# Patient Record
Sex: MALE | Race: WHITE | URBAN - METROPOLITAN AREA
[De-identification: names, ages, dates, MRNs, and addresses within clinical notes are randomized per-mention and may not be internally consistent; named-entity substitution may affect disease eponyms.]

---

## 2022-12-22 ENCOUNTER — EMERGENCY (EMERGENCY)
Facility: HOSPITAL | Age: 72
LOS: 0 days | Discharge: HOME | End: 2022-12-23
Attending: EMERGENCY MEDICINE
Payer: MEDICARE

## 2022-12-22 VITALS — HEIGHT: 68 IN | WEIGHT: 251.99 LBS

## 2022-12-22 DIAGNOSIS — R22.0 LOCALIZED SWELLING, MASS AND LUMP, HEAD: ICD-10-CM

## 2022-12-22 DIAGNOSIS — Z91.14 PATIENT'S OTHER NONCOMPLIANCE WITH MEDICATION REGIMEN: ICD-10-CM

## 2022-12-22 DIAGNOSIS — T39.1X6A UNDERDOSING OF 4-AMINOPHENOL DERIVATIVES, INITIAL ENCOUNTER: ICD-10-CM

## 2022-12-22 DIAGNOSIS — R23.8 OTHER SKIN CHANGES: ICD-10-CM

## 2022-12-22 DIAGNOSIS — T39.396A UNDERDOSING OF OTHER NONSTEROIDAL ANTI-INFLAMMATORY DRUGS [NSAID], INITIAL ENCOUNTER: ICD-10-CM

## 2022-12-22 DIAGNOSIS — L29.9 PRURITUS, UNSPECIFIED: ICD-10-CM

## 2022-12-22 DIAGNOSIS — T45.0X6A UNDERDOSING OF ANTIALLERGIC AND ANTIEMETIC DRUGS, INITIAL ENCOUNTER: ICD-10-CM
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
## 2022-12-22 DIAGNOSIS — I10 ESSENTIAL (PRIMARY) HYPERTENSION: ICD-10-CM

## 2022-12-22 DIAGNOSIS — L90.5 SCAR CONDITIONS AND FIBROSIS OF SKIN: ICD-10-CM

## 2022-12-22 DIAGNOSIS — R58 HEMORRHAGE, NOT ELSEWHERE CLASSIFIED: ICD-10-CM
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
## 2022-12-22 DIAGNOSIS — T78.49XA OTHER ALLERGY, INITIAL ENCOUNTER: ICD-10-CM

## 2022-12-22 DIAGNOSIS — Z96.651 PRESENCE OF RIGHT ARTIFICIAL KNEE JOINT: ICD-10-CM

## 2022-12-22 PROCEDURE — 96374 THER/PROPH/DIAG INJ IV PUSH: CPT

## 2022-12-22 PROCEDURE — 99284 EMERGENCY DEPT VISIT MOD MDM: CPT

## 2022-12-22 PROCEDURE — 99284 EMERGENCY DEPT VISIT MOD MDM: CPT | Mod: 25

## 2022-12-22 PROCEDURE — 96375 TX/PRO/DX INJ NEW DRUG ADDON: CPT

## 2022-12-22 RX ORDER — SODIUM CHLORIDE 9 MG/ML
1000 INJECTION INTRAMUSCULAR; INTRAVENOUS; SUBCUTANEOUS ONCE
Refills: 0 | Status: COMPLETED | OUTPATIENT
Start: 2022-12-22 | End: 2022-12-22

## 2022-12-22 RX ORDER — DIPHENHYDRAMINE HCL 50 MG
50 CAPSULE ORAL ONCE
Refills: 0 | Status: DISCONTINUED | OUTPATIENT
Start: 2022-12-22 | End: 2022-12-22

## 2022-12-22 RX ORDER — OXYCODONE HYDROCHLORIDE 5 MG/1
10 TABLET ORAL ONCE
Refills: 0 | Status: DISCONTINUED | OUTPATIENT
Start: 2022-12-22 | End: 2022-12-22

## 2022-12-22 RX ORDER — FAMOTIDINE 10 MG/ML
20 INJECTION INTRAVENOUS ONCE
Refills: 0 | Status: COMPLETED | OUTPATIENT
Start: 2022-12-22 | End: 2022-12-22

## 2022-12-22 RX ADMIN — FAMOTIDINE 20 MILLIGRAM(S): 10 INJECTION INTRAVENOUS at 20:04

## 2022-12-22 RX ADMIN — SODIUM CHLORIDE 1000 MILLILITER(S): 9 INJECTION INTRAMUSCULAR; INTRAVENOUS; SUBCUTANEOUS at 20:04

## 2022-12-22 RX ADMIN — OXYCODONE HYDROCHLORIDE 10 MILLIGRAM(S): 5 TABLET ORAL at 23:36

## 2022-12-22 RX ADMIN — Medication 125 MILLIGRAM(S): at 20:03

## 2022-12-22 NOTE — ED PROVIDER NOTE - PATIENT PORTAL LINK FT
You can access the FollowMyHealth Patient Portal offered by [facility] by registering at the following website: [email or web address]. By joining [facility]’s FollowMyHealth portal, you will also be able to view your health information using other applications (apps) compatible with our system.

## 2022-12-22 NOTE — ED PROVIDER NOTE - CLINICAL SUMMARY MEDICAL DECISION MAKING FREE TEXT BOX
Pt with lip swelling, itchiness of the hands, allergic reaction versus angioedema. Has been on ACE inhibitor for several years, multiple new medications after recent surgery but nothing new started today. No obvious etiology, however will treat with steroids, pepcid, pulse ox monitoring, has already taken full dose benadryl at home. No respiratory distress. Will continue observation and reassess.

## 2022-12-22 NOTE — ED PROVIDER NOTE - NS ED ROS FT
Constitutional: No fevers, chills, or sweats.  HENT: + Swollen lower lip   Cardiac: No chest pain, exertional dyspnea, orthopnea  Respiratory: No shortness of breath, no cough  GI: No abdominal pain, no N/V/D  Neuro: No headaches, no neck pain/stiffness, no numbness  Skin: + Itchy palms and testes  All other systems reviewed and are negative unless otherwise stated in the HPI.

## 2022-12-22 NOTE — ED PROVIDER NOTE - NSFOLLOWUPINSTRUCTIONS_ED_ALL_ED_FT
Please continue steroids and pepcid as prescribed.  Please use new medications with caution as we are unsure which medication caused this allergic reaction.  Please call your doctor to update them and let them know.  Please return to the ER if there is any recurrence of symptoms.  Please take Benadryl if you develop any itch or rash.    If you develop any swelling of your lips tongue or mouth or airway or have any difficulty breathing please give yourself an injection with the epinephrine pen (EpiPen) and, directly to an emergency room

## 2022-12-22 NOTE — ED ADULT NURSE NOTE - NSIMPLEMENTINTERV_GEN_ALL_ED
Implemented All Fall Risk Interventions:  [place] to call system. Call bell, personal items and telephone within reach. Instruct patient to call for assistance. Room bathroom lighting operational. Non-slip footwear when patient is off stretcher. Physically safe environment: no spills, clutter or unnecessary equipment. Stretcher in lowest position, wheels locked, appropriate side rails in place. Provide visual cue, wrist band, yellow gown, etc. Monitor gait and stability. Monitor for mental status changes and reorient to person, place, and time. Review medications for side effects contributing to fall risk. Reinforce activity limits and safety measures with patient and family.

## 2022-12-22 NOTE — ED PROVIDER NOTE - PROGRESS NOTE DETAILS
Mavis MORALES: Signout received from Dr. Decker pending reevaluation at 1 AM.  Patient with improvement of swelling of lower lip no recurrence.  Will discharge home.

## 2022-12-22 NOTE — ED ADULT NURSE NOTE - OBJECTIVE STATEMENT
Pt presents to the ED c/o allergic reaction. Pt states that at 5pm he noticed lip swelling and itching bilateral palms. Pt denies SOB, nausea, throat itching. No edema of the tongue. Patient saturating 98% on room air. Pt not in respiratory distress. Pt endorses taking 3 benadryl before coming to the ED. Pt reports no drug allergies. Pt had knee replacement and was placed on new medications but has not taken in 1 week.

## 2022-12-22 NOTE — ED STATDOCS - NS_ ATTENDINGSCRIBEDETAILS _ED_A_ED_FT
I, Vamshi Najera MD,  performed the initial face to face bedside interview with this patient regarding history of present illness, review of symptoms and relevant past medical, social and family history.  I completed an independent physical examination.  I was the initial provider who evaluated this patient.   I personally saw the patient and performed a substantive portion of the visit including all aspects of the medical decision making.  The history, relevant review of systems, past medical and surgical history, medical decision making, and physical examination was documented by the scribe in my presence and I attest to the accuracy of the documentation.

## 2022-12-22 NOTE — ED PROVIDER NOTE - PHYSICAL EXAMINATION
General: AAOx3, NAD  HEENT: Lower lip swelling. No tongue, posterior pharynx, or upper lip swelling. Speech is clear.   Cardiac: Normal rate and rhythm, no murmurs, normal peripheral perfusion  Respiratory: Normal rate and effort. CTAB  GI: Soft, nondistended, nontender. Scrotal edema, no tenderness, no palpable testicular swelling.   Neuro: No focal deficits. CAPONE equally x4, sensation to light touch intact throughout  MSK: FROMx4, no focal bony tenderness, RLE post-operative changes with mild swelling, ecchymosis. Anterior knee surgical scar c/d/i.   Skin: No rash

## 2022-12-22 NOTE — ED STATDOCS - PROGRESS NOTE DETAILS
Mckenzie Najera:  71 y/o male PMHx of HTN on Lisinopril presents to the ED for allergic reaction. Pt states that about an hour ago he noticed swelling of his lip and complains of itching of bilateral palms. Pt denies throat itching and closing sensation, and nausea. No respiratory distress. Pt took 3 benadryl prior to arrival in ED. Pt reports he has never had an allergic reaction before. Will send pt to main ED for further evaluation. Mckenzie Johnson for Dr. ANA PAULA Najera:  71 y/o male PMHx of HTN on Lisinopril presents to the ED for allergic reaction. Pt states that about an hour ago he noticed swelling of his lip and complains of itching of bilateral palms. Pt denies throat itching and closing sensation, and nausea. No respiratory distress but does have mild sob. Pt took 3 benadryl prior to arrival in ED. Pt reports he has never had an allergic reaction before. +angioedema allergic vs lisinopril Will send pt to main ED for further evaluation.

## 2022-12-22 NOTE — ED ADULT TRIAGE NOTE - CHIEF COMPLAINT QUOTE
Pt comes to the ED complaining of allergic reaction. Pt states that about an hour ago he noticed swelling of his lip. Pt complains of itching of bilateral arms. No respiratory distress. Pt took 3 benadryl prior to arrival in ED.

## 2022-12-22 NOTE — ED PROVIDER NOTE - OBJECTIVE STATEMENT
73 y/o male with PMHx of HTN s/p right knee replacement surgery 12/10/2022 presents to the ED c/o allergic reaction. Pt currently on numerous medications due to surgery. Prior to surgery, pt was taking only lisinopril, statin, and aspirin. Pt states that at about 5 P.M today he noticed swelling of his lip and itching of bilateral palms. Also notes testicular itching. Denies respiratory distress but notes mild SOB. Pt took 3 benadryl prior to arrival in ED. No prior Hx of allergic reaction. New meds post surgery: tylenol, meloxicam, zofran (hasn't taken it for 1 week) oxycodone, protonix, miralax, senna. Last took meloxicam today at 3 P.M. Not on antibiotics.

## 2022-12-23 VITALS
HEART RATE: 82 BPM | TEMPERATURE: 98 F | SYSTOLIC BLOOD PRESSURE: 140 MMHG | RESPIRATION RATE: 18 BRPM | DIASTOLIC BLOOD PRESSURE: 88 MMHG | OXYGEN SATURATION: 94 %

## 2022-12-23 RX ORDER — FAMOTIDINE 10 MG/ML
1 INJECTION INTRAVENOUS
Qty: 28 | Refills: 0
Start: 2022-12-23 | End: 2023-01-05

## 2022-12-23 RX ORDER — EPINEPHRINE 0.3 MG/.3ML
0.3 INJECTION INTRAMUSCULAR; SUBCUTANEOUS
Qty: 1 | Refills: 0
Start: 2022-12-23

## 2022-12-25 ENCOUNTER — EMERGENCY (EMERGENCY)
Facility: HOSPITAL | Age: 72
LOS: 0 days | Discharge: HOME | End: 2022-12-25
Attending: EMERGENCY MEDICINE
Payer: MEDICARE

## 2022-12-25 VITALS
HEART RATE: 72 BPM | RESPIRATION RATE: 18 BRPM | DIASTOLIC BLOOD PRESSURE: 97 MMHG | SYSTOLIC BLOOD PRESSURE: 161 MMHG | TEMPERATURE: 98 F | OXYGEN SATURATION: 100 %

## 2022-12-25 VITALS — HEIGHT: 68 IN

## 2022-12-25 DIAGNOSIS — Z96.651 PRESENCE OF RIGHT ARTIFICIAL KNEE JOINT: Chronic | ICD-10-CM
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
## 2022-12-25 DIAGNOSIS — Z96.651 PRESENCE OF RIGHT ARTIFICIAL KNEE JOINT: ICD-10-CM

## 2022-12-25 DIAGNOSIS — I10 ESSENTIAL (PRIMARY) HYPERTENSION: ICD-10-CM

## 2022-12-25 DIAGNOSIS — T78.49XA OTHER ALLERGY, INITIAL ENCOUNTER: ICD-10-CM

## 2022-12-25 DIAGNOSIS — E78.5 HYPERLIPIDEMIA, UNSPECIFIED: ICD-10-CM

## 2022-12-25 DIAGNOSIS — R22.0 LOCALIZED SWELLING, MASS AND LUMP, HEAD: ICD-10-CM

## 2022-12-25 PROCEDURE — 99283 EMERGENCY DEPT VISIT LOW MDM: CPT

## 2022-12-25 RX ORDER — OXYCODONE HYDROCHLORIDE 5 MG/1
5 TABLET ORAL ONCE
Refills: 0 | Status: DISCONTINUED | OUTPATIENT
Start: 2022-12-25 | End: 2022-12-25

## 2022-12-25 RX ADMIN — OXYCODONE HYDROCHLORIDE 5 MILLIGRAM(S): 5 TABLET ORAL at 09:57

## 2022-12-25 RX ADMIN — Medication 40 MILLIGRAM(S): at 09:57

## 2022-12-25 NOTE — ED PROVIDER NOTE - PHYSICAL EXAMINATION
GEN - NAD; well appearing; A+O x3   HEAD - NC/AT     EYES - EOMI, no conjunctival pallor, no scleral icterus  ENT -   mucous membranes  moist , no discharge, mild swelling to the ala     NECK - Neck supple  PULM - CTA b/l,  symmetric breath sounds  COR -  RRR, S1 S2, no murmurs  ABD - , ND, NT, soft, no guarding, no rebound, no masses    BACK - no CVA tenderness, nontender spine     EXTREMS - no edema, no deformity, warm and well perfused    SKIN - no rash or bruising      NEUROLOGIC - alert, sensation nl, motor 5/5 RUE/LUE/RLE/LLE

## 2022-12-25 NOTE — ED PROVIDER NOTE - OBJECTIVE STATEMENT
71 yo male w/PMHx of HTN, HLD, s/p knee replacement on 12/10 presents to the ED c/o allergic reaction. Pt was seen here in the ED on Thursday 12/22 for a similar complaint. Pt was dc on steroids. Pt is on lisinopril. Pt states that last night he had swelling to his palms and groin area but reports that swelling has subsided. Pt has swelling to the upper lip. Pt took the prednisone and famotidine this morning as well as 2 benadryl PTA. According to daughter, the swelling to the lip is slowly resolving from what it was this morning.

## 2022-12-25 NOTE — ED ADULT TRIAGE NOTE - CHIEF COMPLAINT QUOTE
Pt arrives to ED complaining of swelling to upper lip after waking up this morning. Pt had recent allergic reaction last week with similar symptoms. No known allergy history.

## 2022-12-25 NOTE — ED PROVIDER NOTE - PATIENT PORTAL LINK FT
You can access the FollowMyHealth Patient Portal offered by [facility] by registering at the following website: [email or web address]. By joining [employer]s FollowMyHealth portal, you will also be able to view your health information using other applications (apps) compatible with our system.

## 2022-12-25 NOTE — ED ADULT NURSE NOTE - OBJECTIVE STATEMENT
Patient presents to the emergency room with allergic reaction. Patient states he woke up this morning and had lip swelling that has since improved since arrival to hospital. Patient not in any acute respiratory distress, airway patent, no chest pain, fever, redness. Patient states he already takes prednisone at home 20mg. Patient s/p right knee replacement.

## 2022-12-25 NOTE — ED ADULT NURSE NOTE - NSIMPLEMENTINTERV_GEN_ALL_ED
Implemented All Fall Risk Interventions:  [place] to call system. Call bell, personal items and telephone within reach. Instruct patient to call for assistance. Room bathroom lighting operational. Non-slip footwear when patient is off stretcher. Physically safe environment: no spills, clutter or unnecessary equipment. Stretcher in lowest position, wheels locked, appropriate side rails in place. Provide visual cue, wrist band, yellow gown, etc. Monitor gait and stability. Monitor for mental status changes and reorient to person, place, and time. Review medications for side effects contributing to fall risk. Reinforce activity limits and safety measures with patient and family.

## 2022-12-25 NOTE — ED PROVIDER NOTE - NSFOLLOWUPINSTRUCTIONS_ED_ALL_ED_FT
PLease take prednisone taper as follows.   5 tab(s) orally once a day x 2 days  4 tab(s) orally once a day x 2 days  3 tab(s) orally once a day x 2 days  2 tab(s) orally once a day x 2 days  1 tab(s) orally once a day x 2 days    PLEase follow up with an allergist. Return to ED if any shortness of breath, oral swelling,  or difficulty swallowing,

## 2022-12-25 NOTE — ED PROVIDER NOTE - CLINICAL SUMMARY MEDICAL DECISION MAKING FREE TEXT BOX
Pt with recent allergic reaction on prednisone, Pepcid and benadryl. Will increase prednisone and taper. Recommended allergy testing. Pt may have trace angioedema but no airway involvement. Will give meds and reassess.
